# Patient Record
Sex: MALE | Race: WHITE | Employment: FULL TIME | URBAN - NONMETROPOLITAN AREA
[De-identification: names, ages, dates, MRNs, and addresses within clinical notes are randomized per-mention and may not be internally consistent; named-entity substitution may affect disease eponyms.]

---

## 2017-12-25 ENCOUNTER — HOSPITAL ENCOUNTER (EMERGENCY)
Age: 32
Discharge: HOME | End: 2017-12-25
Payer: COMMERCIAL

## 2017-12-25 VITALS
DIASTOLIC BLOOD PRESSURE: 79 MMHG | HEART RATE: 84 BPM | SYSTOLIC BLOOD PRESSURE: 138 MMHG | BODY MASS INDEX: 30.85 KG/M2 | TEMPERATURE: 97.9 F | WEIGHT: 215 LBS | RESPIRATION RATE: 18 BRPM | OXYGEN SATURATION: 96 %

## 2017-12-25 DIAGNOSIS — R04.0 EPISTAXIS: Primary | ICD-10-CM

## 2017-12-25 DIAGNOSIS — J11.1 INFLUENZA WITH RESPIRATORY MANIFESTATION OTHER THAN PNEUMONIA: ICD-10-CM

## 2017-12-25 LAB
FLU A ANTIGEN: POSITIVE
FLU B ANTIGEN: NEGATIVE

## 2017-12-25 PROCEDURE — 99283 EMERGENCY DEPT VISIT LOW MDM: CPT

## 2017-12-25 PROCEDURE — 87804 INFLUENZA ASSAY W/OPTIC: CPT

## 2017-12-25 PROCEDURE — 30901 CONTROL OF NOSEBLEED: CPT

## 2017-12-25 RX ORDER — AMOXICILLIN AND CLAVULANATE POTASSIUM 875; 125 MG/1; MG/1
1 TABLET, FILM COATED ORAL 2 TIMES DAILY
Qty: 20 TABLET | Refills: 0 | Status: SHIPPED | OUTPATIENT
Start: 2017-12-25 | End: 2018-01-04

## 2017-12-25 RX ORDER — HYDROCODONE BITARTRATE AND ACETAMINOPHEN 5; 325 MG/1; MG/1
1 TABLET ORAL EVERY 6 HOURS PRN
Qty: 20 TABLET | Refills: 0 | Status: SHIPPED | OUTPATIENT
Start: 2017-12-25 | End: 2018-01-01

## 2017-12-25 ASSESSMENT — ENCOUNTER SYMPTOMS
SORE THROAT: 0
VOMITING: 0
RHINORRHEA: 1
BACK PAIN: 0
SHORTNESS OF BREATH: 0
EYE REDNESS: 0
WHEEZING: 0
DIARRHEA: 0
COUGH: 1
EYE DISCHARGE: 0
NAUSEA: 0
ABDOMINAL PAIN: 0

## 2017-12-26 NOTE — ED NOTES
Pt presents to the ED through triage with complaints of epistaxis, cough, and nasal congestion. Pt has had a cough and nasal congestion for the past couple of days. States that he had a fever and was taking medicine for that and OTC medicine for his symptoms. Wife wants him to be tested for the flu. Pt also developed a bloody nose today. Wife states that she used a \"slim tampon\" up the nose to try to stop the bleeding, states that he is currently on his fourth one, so decided to come in because it wasn't stopping.      Paola De León RN  12/25/17 [id number]

## 2017-12-26 NOTE — ED PROVIDER NOTES
Soft. He exhibits no distension. Musculoskeletal: Normal range of motion. He exhibits no edema. Neurological: He is alert and oriented to person, place, and time. He exhibits normal muscle tone. GCS eye subscore is 4. GCS verbal subscore is 5. GCS motor subscore is 6. Skin: Skin is warm and dry. He is not diaphoretic. No erythema. Psychiatric: He has a normal mood and affect. His behavior is normal.   Nursing note and vitals reviewed. DIFFERENTIAL DIAGNOSIS:   Including but not limited to: Epistaxis. DIAGNOSTIC RESULTS     EKG: All EKG's are interpreted by the Emergency Department Physician who either signs or Co-signs this chart in the absence of a cardiologist.    None    RADIOLOGY: non-plain film images(s) such as CT, Ultrasound and MRI are read by the radiologist.    No orders to display       LABS:   Labs Reviewed   RAPID INFLUENZA A/B ANTIGENS - Abnormal; Notable for the following:        Result Value    Flu A Antigen POSITIVE (*)     All other components within normal limits       EMERGENCY DEPARTMENT COURSE:   Vitals:    Vitals:    12/25/17 2126   BP: 138/79   Pulse: 84   Resp: 18   Temp: 97.9 °F (36.6 °C)   TempSrc: Oral   SpO2: 96%   Weight: 215 lb (97.5 kg)     9:25 PM: The patient was seen and evaluated. The patient was seen and evaluated within the ED today with epistaxis. Within the department, I observed the patient's vital signs to be within acceptable range. On exam, I appreciated the right nostril to be actively bleeding. It appears to be coming from the hessle box plexus. No other signs of injury. I observed the patient's condition to improve during the duration of the stay. I explained my proposed course of treatment to the patient, who was amenable to my treatment and discharge decisions.  The patient was discharged home in stable condition with prescriptions for Augmentin and Norco, and the patient will return to the ED if the symptoms become more severe in nature or otherwise change. CRITICAL CARE:   None     CONSULTS:  None    PROCEDURES:  Epistaxis Mgmt  Date/Time: 12/25/2017 9:46 PM  Performed by: Keyana Arevalo  Authorized by: Keyana Arevalo     Consent:     Consent obtained:  Verbal    Consent given by:  Patient    Risks discussed:  Bleeding, infection, nasal injury and pain    Alternatives discussed:  No treatment  Anesthesia (see MAR for exact dosages): Anesthesia method:  Topical application    Topical anesthetic:  Lidocaine gel and epinephrine  Post-procedure details:     Assessment:  Bleeding stopped    Patient tolerance of procedure: Tolerated well, no immediate complications          FINAL IMPRESSION      1. Epistaxis    2. Influenza with respiratory manifestation other than pneumonia          DISPOSITION/PLAN   Patient was discharged in stable condition. Will return if symptoms change or worsen, or for any sign or symptom deemed emergent by the patient or family members. Follow up as an outpatient, sooner if symptoms warrant. PATIENT REFERRED TO:  No follow-up provider specified.     DISCHARGE MEDICATIONS:  Discharge Medication List as of 12/25/2017 11:11 PM      START taking these medications    Details   amoxicillin-clavulanate (AUGMENTIN) 875-125 MG per tablet Take 1 tablet by mouth 2 times daily for 10 days, Disp-20 tablet, R-0Print      HYDROcodone-acetaminophen (NORCO) 5-325 MG per tablet Take 1 tablet by mouth every 6 hours as needed for Pain ., Disp-20 tablet, R-0Print             (Please note that portions of this note were completed with a voice recognition program.  Efforts were made to edit the dictations but occasionally words are mis-transcribed.)    Scribe:  Signed by: Marie Edgar, 12/25/17 9:25 PM Scribing for and in the presence of Kaylan Jerry PA-C    Signed by: Marie Edgar, 12/25/17 7:06 PM    Provider:  I personally performed the services described in the documentation, reviewed and edited the documentation which was dictated to the scribe in my presence, and it accurately records my words and actions.     Kaylan Jerry PA-C 12/25/17 7:06 PM        GIOVANNI Peña  12/26/17 [id number]

## 2021-01-22 ENCOUNTER — NURSE ONLY (OUTPATIENT)
Dept: LAB | Age: 36
End: 2021-01-22

## 2024-10-31 ENCOUNTER — HOSPITAL ENCOUNTER (OUTPATIENT)
Dept: PHYSICAL THERAPY | Age: 39
Setting detail: THERAPIES SERIES
Discharge: HOME | End: 2024-10-31

## 2024-10-31 NOTE — PROGRESS NOTES
[facility]  PHYSICAL THERAPY MISSED TREATMENT NOTE  [facility]    Date: 10/31/2024  Patient Name: Michael Aarna        MRN: [id number]   : 1985  (38 y.o.)  Gender: male                REASON FOR MISSED TREATMENT:  Hold per therapist's recommendation .   has gone through extensive testing/imaging and unfortunately has not been appropriately evaluated. He is scheduled to see specialist with [facility] next Friday. According to , Dr. Carrillo, Dr. Prado and Dr. Rodriguez have all mentioned their concerns with his back. Since this pain started back in February it has become progressively worse and he is noticing greater difficulty with bowel movements. Encouraged him to contact our office with update and will happily evaluate him again if recommended by new specialist.    Elisabeth \"Kelly\" Yobany, DPT  [id number]

## 2024-12-02 ENCOUNTER — OFFICE VISIT (OUTPATIENT)
Dept: PHYSICAL MEDICINE AND REHAB | Age: 39
End: 2024-12-02

## 2024-12-02 VITALS — SYSTOLIC BLOOD PRESSURE: 132 MMHG | DIASTOLIC BLOOD PRESSURE: 84 MMHG

## 2024-12-02 DIAGNOSIS — M54.17 RADICULOPATHY, LUMBOSACRAL REGION: ICD-10-CM

## 2024-12-02 DIAGNOSIS — M79.2 NEUROPATHIC PAIN: ICD-10-CM

## 2024-12-02 DIAGNOSIS — G89.4 CHRONIC PAIN SYNDROME: Primary | ICD-10-CM

## 2024-12-02 DIAGNOSIS — M51.362 DEGENERATION OF INTERVERTEBRAL DISC OF LUMBAR REGION WITH DISCOGENIC BACK PAIN AND LOWER EXTREMITY PAIN: ICD-10-CM

## 2024-12-02 RX ORDER — HYDROCODONE BITARTRATE AND ACETAMINOPHEN 5; 325 MG/1; MG/1
1 TABLET ORAL 2 TIMES DAILY PRN
Qty: 14 TABLET | Refills: 0 | Status: SHIPPED | OUTPATIENT
Start: 2024-12-02 | End: 2024-12-09

## 2024-12-02 RX ORDER — METHYLPREDNISOLONE ACETATE 40 MG/ML
40 INJECTION, SUSPENSION INTRA-ARTICULAR; INTRALESIONAL; INTRAMUSCULAR; SOFT TISSUE ONCE
Status: COMPLETED | OUTPATIENT
Start: 2024-12-02 | End: 2024-12-02

## 2024-12-02 RX ORDER — FAMOTIDINE 40 MG/1
40 TABLET, FILM COATED ORAL 2 TIMES DAILY
COMMUNITY
Start: 2024-11-08

## 2024-12-02 RX ORDER — SUCRALFATE 1 G/1
1 TABLET ORAL 2 TIMES DAILY
COMMUNITY
Start: 2024-11-08

## 2024-12-02 RX ADMIN — METHYLPREDNISOLONE ACETATE 40 MG: 40 INJECTION, SUSPENSION INTRA-ARTICULAR; INTRALESIONAL; INTRAMUSCULAR; SOFT TISSUE at 09:54

## 2024-12-02 NOTE — PROGRESS NOTES
Functionality Assessment/Goals Worksheet     On a scale of 0 (Does not Interfere) to 10 (Completely Interferes)     1.  Which number describes how during the past week pain has interfered with           the following:  A.  General Activity:  8  B.  Mood: 7  C.  Walking Ability:  8  D.  Normal Work (Includes both work outside the home and housework):  10  E.  Relations with Other People:   7  F.  Sleep:   9  G.  Enjoyment of Life:   3    2.  Patient Prefers to Take their Pain Medications:     []  On a regular basis   [x]  Only when necessary    []  Does not take pain medications    3.  What are the Patient's Goals/Expectations for Visiting Pain Management?     []  Learn about my pain    []  Receive Medication   []  Physical Therapy     []  Treat Depression   [x]  Receive Injections    []  Treat Sleep   []  Deal with Anxiety and Stress   []  Treat Opoid Dependence/Addiction   []  Other:
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
## 2024-12-20 NOTE — DISCHARGE INSTRUCTIONS
Post procedure Instructions:    No driving or making significant decisions for the remainder of the day.   Be cautions with walking and activity for 24 hours, do not over exert yourself.  Ok to resume pre-procedure activity level today.  Apply ice to site of injection site if you have pain or discomfort.  Do not submerge sit of injection during bath or pool activities for 48 hours post-procedure.  Resume blood thinning medications in 24 hours.     Call office [phone] if you have:  Temperature greater than 100.4  Persistent nausea and vomiting  Severe uncontrolled pain  Redness, tenderness, or signs of infection (pain, swelling, redness, odor or green/yellow discharge around the site)  Difficulty breathing, headache or visual disturbances  Hives  Persistent dizziness or light-headedness  Extreme fatigue  Any other questions or concerns you may have after discharge    In an emergency, call 911 or go to an Emergency Department at a nearby hospital    “Surgical Site Infections”      How can we work together to prevent Surgical Site Infections?   We would like to thank you for choosing [facility] for your Surgical Care.  Below you will find helpful information on how we can work together to prevent Surgical Site Infections.    What is a Surgical Site Infection (SSI)?  A surgical site infection is an infection that occurs after surgery in the part of the body where the surgery took place. Most patients who have surgery do not develop an infection. However, infections develop in about 1 to 3 out of every 100 patients who have surgery.  Some of the common symptoms of a surgical site infection are:  Redness and pain around the area where you had surgery  Drainage of cloudy fluid from your surgical wound  Fever    Can SSIs be treated?  Yes. Most surgical site infections can be treated with antibiotics. The antibiotic given to you depends on the bacteria (germs) causing the infection. Sometimes patients with

## 2024-12-30 ENCOUNTER — TELEPHONE (OUTPATIENT)
Dept: PHYSICAL MEDICINE AND REHAB | Age: 39
End: 2024-12-30

## 2024-12-30 NOTE — TELEPHONE ENCOUNTER
Pt's wife called this morning stating that the pt had an infected toenail and they put him on an antibiotic. He finished the antibiotic on Friday, 12/27/24. Can pt still have procedure tomorrow, 12/31/24 or should I reschedule his procedure? Please advise.

## 2024-12-30 NOTE — H&P
Today, patient presents for planned  lumbar epidural steroid injection approach at lumbar 5/sacral 1    This note is reflective of the patient's previous visit for evaluation. We will proceed with today's planned procedure. Since patient's last visit for evaluation, there have been no interval changes in medical history. Patient has no new numbness, weakness, or focal neurological deficit since evaluation. Patient has no contraindications to injection (no anticoagulation or recent antibiotic intake for active infections), and has a  present or is able to drive themselves (as discussed and cleared by physician).  Allergies to latex, contrast dye, and steroid medications have been confirmed with the patient prior to the procedure.  NPO necessity has been assessed and accepted based on procedure complexity. The risks and benefits of the procedure have been explained including but are not limited to infection, bleeding, paralysis, immediate post procedure weakness, and dizziness; the patient acknowledges understanding and desires to proceed with the procedure. Patient has signed consent for same procedure as discussed in previous clinic encounter. All other questions and concerns were addressed at bedside. See procedure note for full details.       Post procedure Instructions: The patient was advised not to drive during the day of the procedure and not to engage in any significant decision making (unless otherwise states by physician). The patient was also advised to be cautious with walking/activity for 24 hours following today's visit and asked not to engage in over-exertion (unless otherwise states by physician). After this time, it is ok to resume pre-procedure level of activity. Patient advised to apply ice to site of injection in situations of pain and discomfort. Patient advised to not submerge site of injection during bath or pool activities for approximately 24 hours post-procedure. Patient attested to

## 2024-12-31 ENCOUNTER — APPOINTMENT (OUTPATIENT)
Dept: GENERAL RADIOLOGY | Age: 39
End: 2024-12-31
Attending: ANESTHESIOLOGY
Payer: COMMERCIAL

## 2024-12-31 ENCOUNTER — HOSPITAL ENCOUNTER (OUTPATIENT)
Age: 39
Setting detail: OUTPATIENT SURGERY
Discharge: HOME | End: 2024-12-31
Attending: ANESTHESIOLOGY | Admitting: ANESTHESIOLOGY
Payer: COMMERCIAL

## 2024-12-31 VITALS
TEMPERATURE: 98.2 F | RESPIRATION RATE: 16 BRPM | WEIGHT: 219 LBS | OXYGEN SATURATION: 95 % | HEART RATE: 67 BPM | HEIGHT: 69 IN | BODY MASS INDEX: 32.44 KG/M2 | SYSTOLIC BLOOD PRESSURE: 127 MMHG | DIASTOLIC BLOOD PRESSURE: 78 MMHG

## 2024-12-31 PROCEDURE — 6360000002 HC RX W HCPCS: Performed by: ANESTHESIOLOGY

## 2024-12-31 PROCEDURE — 2500000003 HC RX 250 WO HCPCS: Performed by: ANESTHESIOLOGY

## 2024-12-31 PROCEDURE — 6360000004 HC RX CONTRAST MEDICATION: Performed by: ANESTHESIOLOGY

## 2024-12-31 PROCEDURE — 62323 NJX INTERLAMINAR LMBR/SAC: CPT | Performed by: ANESTHESIOLOGY

## 2024-12-31 PROCEDURE — 2709999900 HC NON-CHARGEABLE SUPPLY: Performed by: ANESTHESIOLOGY

## 2024-12-31 PROCEDURE — 7100000010 HC PHASE II RECOVERY - FIRST 15 MIN: Performed by: ANESTHESIOLOGY

## 2024-12-31 PROCEDURE — 3600000054 HC PAIN LEVEL 3 BASE: Performed by: ANESTHESIOLOGY

## 2024-12-31 RX ORDER — LIDOCAINE HYDROCHLORIDE 10 MG/ML
INJECTION, SOLUTION EPIDURAL; INFILTRATION; INTRACAUDAL; PERINEURAL PRN
Status: DISCONTINUED | OUTPATIENT
Start: 2024-12-31 | End: 2024-12-31 | Stop reason: ALTCHOICE

## 2024-12-31 RX ORDER — DEXAMETHASONE SODIUM PHOSPHATE 4 MG/ML
INJECTION, SOLUTION INTRA-ARTICULAR; INTRALESIONAL; INTRAMUSCULAR; INTRAVENOUS; SOFT TISSUE PRN
Status: DISCONTINUED | OUTPATIENT
Start: 2024-12-31 | End: 2024-12-31 | Stop reason: ALTCHOICE

## 2024-12-31 RX ORDER — ACYCLOVIR 200 MG/1
CAPSULE ORAL PRN
Status: DISCONTINUED | OUTPATIENT
Start: 2024-12-31 | End: 2024-12-31 | Stop reason: ALTCHOICE

## 2024-12-31 RX ORDER — IOPAMIDOL 612 MG/ML
INJECTION, SOLUTION INTRAVASCULAR PRN
Status: DISCONTINUED | OUTPATIENT
Start: 2024-12-31 | End: 2024-12-31 | Stop reason: ALTCHOICE

## 2024-12-31 ASSESSMENT — PAIN - FUNCTIONAL ASSESSMENT
PAIN_FUNCTIONAL_ASSESSMENT: 0-10
PAIN_FUNCTIONAL_ASSESSMENT: 0-10

## 2024-12-31 NOTE — PROCEDURES
Pre-operative Diagnosis: Radicular leg pain     Post-operative Diagnosis: Radicular leg pain     Procedure: Lumbar epidural steroid injection    Procedure Description:  After having obtained a signed informed consent, the patient was taken to the fluoroscopy suite and placed in the prone position. The patient's back was prepped with chloraprep and draped in a sterile fashion.  A total of 1.5 cc of 1 % lidocaine was used to anesthetize the skin and underlying tissues.  Under fluoroscopic guidance, a single 20G Tuohy needle was advanced using midline approach at the L5/S1 interspace until gaining the epidural space using the loss of resistance to saline syringe technique.  There were no paresthesias, heme, or CSF aspiration.  A total of 0.25 cc of Omnipaque 300 were injected having had adequate dye spread within the epidural space. Needle placement and contrast spread was confirmed using the AP and contralateral views.  10 mg of Dexamethasone with 1 cc of saline solution were injected in the epidural space. The needle was flushed and removed without any complication.  The patient tolerated the procedure well and was transported to the recovery room. The patient was observed for 15 minutes to then discharged in an ambulatory fashion.    Procedural Complications: None  Estimated Blood Loss: 0 mL        Dangelo Ha DO  Interventional Pain Management/PM&R   [facility]

## 2024-12-31 NOTE — PROGRESS NOTES
1404 Patient arrived to phase II via cart.  Spontaneous respiraitons even and unlabored.  Placed on monitor--VSS.  Report received from OR RN    1405 Assessment completed.  Patient is alert and oriented x4. Patient denies pain--will monitor.  Injection sites clean and dry.      1406  Pt. Denies all other needs at this time, and states readiness for discharge.    1407 Pt. Standing at bedside. With stand by assist of RN. Pt. Denies weakness or dizziness.    1408 Pt. Getting self dressed. Family notified of discharge    1411 Pt. Ambulated to private vehicle in stable condition with stand by assist of RN.

## 2025-02-12 ENCOUNTER — OFFICE VISIT (OUTPATIENT)
Dept: PHYSICAL MEDICINE AND REHAB | Age: 40
End: 2025-02-12
Payer: COMMERCIAL

## 2025-02-12 VITALS
BODY MASS INDEX: 32.44 KG/M2 | WEIGHT: 219 LBS | DIASTOLIC BLOOD PRESSURE: 70 MMHG | SYSTOLIC BLOOD PRESSURE: 112 MMHG | HEIGHT: 69 IN

## 2025-02-12 DIAGNOSIS — G89.4 CHRONIC PAIN SYNDROME: Primary | ICD-10-CM

## 2025-02-12 DIAGNOSIS — M54.17 RADICULOPATHY, LUMBOSACRAL REGION: ICD-10-CM

## 2025-02-12 DIAGNOSIS — M79.2 NEUROPATHIC PAIN: ICD-10-CM

## 2025-02-12 DIAGNOSIS — M51.362 DEGENERATION OF INTERVERTEBRAL DISC OF LUMBAR REGION WITH DISCOGENIC BACK PAIN AND LOWER EXTREMITY PAIN: ICD-10-CM

## 2025-02-12 PROCEDURE — 99214 OFFICE O/P EST MOD 30 MIN: CPT | Performed by: NURSE PRACTITIONER

## 2025-02-12 RX ORDER — UBROGEPANT 100 MG/1
100 TABLET ORAL DAILY PRN
Qty: 16 TABLET | Refills: 0 | Status: SHIPPED | OUTPATIENT
Start: 2025-02-12

## 2025-02-12 RX ORDER — TOPIRAMATE SPINKLE 25 MG/1
25 CAPSULE ORAL 3 TIMES DAILY
COMMUNITY

## 2025-02-12 NOTE — PROGRESS NOTES
[facility] PROFESSIONAL SERVS  [facility] NEUROSCIENCE AND REHABILITATION [address]  [facility] [address]  Dept: [phone]  Dept Fax: [phone]  Loc: [phone]    Visit Date: 2/12/2025    Functionality Assessment/Goals Worksheet     On a scale of 0 (Does not Interfere) to 10 (Completely Interferes)     1.  Which number describes how during the past week pain has interfered with       the following:  A.  General Activity:  7  B.  Mood: 7  C.  Walking Ability:  8  D.  Normal Work (Includes both work outside the home and housework):  8  E.  Relations with Other People:   7  F.  Sleep:   7  G.  Enjoyment of Life:   7    2.  Patient Prefers to Take their Pain Medications:     []  On a regular basis   [x]  Only when necessary    []  Does not take pain medications    3.  What are the Patient's Goals/Expectations for Visiting Pain Management?     []  Learn about my pain    []  Receive Medication   []  Physical Therapy     []  Treat Depression   []  Receive Injections    []  Treat Sleep   []  Deal with Anxiety and Stress   []  Treat Opoid Dependence/Addiction   [x]  Other:  
Ubrelvy 100 mg.  I also sent a prescription to the pharmacy.  He can call if he like a refill of Norco.  He will continue work with [facility] neuromuscular.  We will touch base in 2 months.  He may benefit from a transforaminal lumbar epidural steroid injection in the future.    Plan:   The following treatment recommendations and plan were discussed in detail with Michael Arana.    Imaging:   I have reviewed patient’s imaging of lumbar MRI and results were discussed with patient today.     Analgesics:   The patient is currently managing their pain with the use of Norco 5-325 mg sparingly. I have refilled this medication BID PRN x 7 days for severe pain >7/10.      Patient is advised to take the medication as prescribed as taking more than prescribed can lead to the development of tolerance, physical dependency, and addiction.  Patient is advised to store and lock the medication in a safe and secure location.  If the medication is lost or stolen we will not provide early refills on this medication.  Patient understands that they must stay compliant with treatment plan outlined above in order to stay compliant with opioid therapy and any deviation from treatment plan will result in cessation of opioid therapy.  Risks of long-term opioid therapy were discussed in extensive detail with the patient and patient understands the risks involved with continuous opioids to manage chronic pain.     Patient has been compliant with office visits, the rehabilitation plan including attending therapy as warranted, and injection therapy.  Patient has not demonstrated any aberrant behavior with medication.  Pain contract signed and reviewed by patient 12/02/2024.  Patient understands if the contract is violated in any way opioid therapy will be discontinued immediately and/or offered an appropriate weaning schedule.  OARRS reviewed and is appropriate.  Last UDS: will obtain at follow up if needed      Patient is taking

## 2025-03-13 ENCOUNTER — HOSPITAL ENCOUNTER (OUTPATIENT)
Dept: PHYSICAL THERAPY | Age: 40
Setting detail: THERAPIES SERIES
Discharge: HOME | End: 2025-03-13
Payer: COMMERCIAL

## 2025-03-13 PROCEDURE — 97162 PT EVAL MOD COMPLEX 30 MIN: CPT

## 2025-03-13 PROCEDURE — 97110 THERAPEUTIC EXERCISES: CPT

## 2025-03-13 NOTE — PROGRESS NOTES
pain   []  Patient limited by medical complications  []  Other:     Assessment:  is a 39 yr old gentleman referred to PT to address residual weakness throughout his core, LE and UE following confirmed lumbar disc tears and acute flare up of severe fibromyalgia. Tio was initially sent here for PT last October, however his pain was not well managed and after following up with [facility] he was put on rest for the last few months. Today's evaluation indicated deficits in both UE and LE strength, limited hamstring flexibility and core weakness consistent with months of inactivity. He will greatly benefit from PT to work to improve core strength/stabilization, lumbar and cervical flexibility and UE strength so he may return to work without limitations.     Body Structures/Functions/Activity Limitations: impaired activity tolerance, impaired endurance, impaired ROM, impaired balance   Prognosis: good    GOALS:  Patient Goal: return to work; improve strength and stamina     Short Term Goals: 4 weeks  Tio will demonstrate and maintain TA activation with all exercises, transfers providing greater internal support to his lumbar spine.  Tio's radicular symptoms down his left leg will centralize with sciatic nerve glide with goal of complete resolution.   Tio's Modified MARIA INES will improve to <15 indicating moderate disability related to his back pain.  Tio's posture will improve as scapular stabilization improves minimizing cervical and scapular protraction.    Long Term Goals: 8 weeks  Tio will be able to bend, stoop, lift, squat with 50# repeatedly, demonstrating good body mechanics, so he may safely return to work.  Tio's Modified MARIA INES will improve to <5 indicating minimal disability related to his back pain.  Tio will be able to stand, sit, walk for 2+ hrs at a time while maintaining good posture so he may safely return to work.   Tio will be discharged from PT with independent  HEP to maintain all gains

## 2025-03-18 ENCOUNTER — HOSPITAL ENCOUNTER (OUTPATIENT)
Dept: PHYSICAL THERAPY | Age: 40
Setting detail: THERAPIES SERIES
Discharge: HOME | End: 2025-03-18
Payer: COMMERCIAL

## 2025-03-18 PROCEDURE — 97110 THERAPEUTIC EXERCISES: CPT

## 2025-03-18 PROCEDURE — 97140 MANUAL THERAPY 1/> REGIONS: CPT

## 2025-03-18 NOTE — PROGRESS NOTES
[facility]  PHYSICAL THERAPY  [] EVALUATION  [x] DAILY NOTE (LAND) [] DAILY NOTE (AQUATIC ) [] PROGRESS NOTE [] DISCHARGE NOTE    [] OUTPATIENT REHABILITATION CENTER [facility]   [] [place] [facility]    [] [facility]   [x] [facility]    Date: 3/18/2025  Patient Name:  Michael rAana  : 1985  MRN: [id number]  CSN: [id number]    Referring Practitioner Ariella Olson PA-C [id number]      Diagnosis  Diagnoses       G89.29 (ICD-10-CM) - Other chronic pain           Treatment Diagnosis M54.59  Other Low Back Pain  M62.81 Generalized Muscle Weakness   Date of Evaluation 3/13/25   Additional Pertinent History Michael Arana has a past medical history of GERD (gastroesophageal reflux disease), Pancreas divisum, and Stomach ulcer.  he has a past surgical history that includes Colonoscopy; Endoscopy, colon, diagnostic; and Pain management procedure (N/A, 2024).     Allergies No Known Allergies   Medications   Current Outpatient Medications:     topiramate (TOPAMAX SPRINKLE) 25 MG capsule, Take 1 capsule by mouth 3 times daily, Disp: , Rfl:     Ubrogepant (UBRELVY) 100 MG TABS, Take 100 mg by mouth daily as needed (pain), Disp: 16 tablet, Rfl: 0    Dextromethorphan HBr (DEXALONE PO), Take by mouth, Disp: , Rfl:     famotidine (PEPCID) 40 MG tablet, Take 1 tablet by mouth 2 times daily, Disp: , Rfl:     sucralfate (CARAFATE) 1 GM tablet, Take 1 tablet by mouth 2 times daily, Disp: , Rfl:       Functional Outcome Measure Used Modified MARIA INES   Functional Outcome Score 25/50 or 50% disability (3/13/25)       Insurance: Primary: Payor: [place] BCBS /  /  / ,   Secondary:    Authorization Information PRE CERTIFICATION REQUIRED: Additional authorization not required.  INSURANCE THERAPY BENEFIT: Allowed 70 visits of OT, PT, COMBINED per calendar year.  0 visits have been used.. ST IS 30 VISITS PER CALENDAR YEAR Hard Max..   AQUATIC THERAPY COVERED: Yes  MODALITIES COVERED:

## 2025-03-20 ENCOUNTER — HOSPITAL ENCOUNTER (OUTPATIENT)
Dept: PHYSICAL THERAPY | Age: 40
Setting detail: THERAPIES SERIES
Discharge: HOME | End: 2025-03-20
Payer: COMMERCIAL

## 2025-03-20 PROCEDURE — 97110 THERAPEUTIC EXERCISES: CPT

## 2025-03-20 NOTE — PROGRESS NOTES
[facility]  PHYSICAL THERAPY  [] EVALUATION  [x] DAILY NOTE (LAND) [] DAILY NOTE (AQUATIC ) [] PROGRESS NOTE [] DISCHARGE NOTE    [] OUTPATIENT REHABILITATION CENTER [facility]   [] [facility]    [] [facility]   [x] [facility]    Date: 3/20/2025  Patient Name:  Michael Arana  : 1985  MRN: [id number]  CSN: [id number]    Referring Practitioner Ariella Olson PA-C [id number]      Diagnosis  Diagnoses       G89.29 (ICD-10-CM) - Other chronic pain           Treatment Diagnosis M54.59  Other Low Back Pain  M62.81 Generalized Muscle Weakness   Date of Evaluation 3/13/25   Additional Pertinent History Michael Arana has a past medical history of GERD (gastroesophageal reflux disease), Pancreas divisum, and Stomach ulcer.  he has a past surgical history that includes Colonoscopy; Endoscopy, colon, diagnostic; and Pain management procedure (N/A, 2024).     Allergies No Known Allergies   Medications   Current Outpatient Medications:     topiramate (TOPAMAX SPRINKLE) 25 MG capsule, Take 1 capsule by mouth 3 times daily, Disp: , Rfl:     Ubrogepant (UBRELVY) 100 MG TABS, Take 100 mg by mouth daily as needed (pain), Disp: 16 tablet, Rfl: 0    Dextromethorphan HBr (DEXALONE PO), Take by mouth, Disp: , Rfl:     famotidine (PEPCID) 40 MG tablet, Take 1 tablet by mouth 2 times daily, Disp: , Rfl:     sucralfate (CARAFATE) 1 GM tablet, Take 1 tablet by mouth 2 times daily, Disp: , Rfl:       Functional Outcome Measure Used Modified MARIA INES   Functional Outcome Score 25/50 or 50% disability (3/13/25)       Insurance: Primary: Payor: [place] BCBS /  /  / ,   Secondary:    Authorization Information PRE CERTIFICATION REQUIRED: Additional authorization not required.  INSURANCE THERAPY BENEFIT: Allowed 70 visits of OT, PT, COMBINED per calendar year.  0 visits have been used.. ST IS 30 VISITS PER CALENDAR YEAR [facility]..   AQUATIC THERAPY COVERED: Yes  MODALITIES COVERED:

## 2025-03-25 ENCOUNTER — HOSPITAL ENCOUNTER (OUTPATIENT)
Dept: PHYSICAL THERAPY | Age: 40
Setting detail: THERAPIES SERIES
Discharge: HOME | End: 2025-03-25
Payer: COMMERCIAL

## 2025-03-25 PROCEDURE — 97140 MANUAL THERAPY 1/> REGIONS: CPT

## 2025-03-25 PROCEDURE — 97110 THERAPEUTIC EXERCISES: CPT

## 2025-03-25 NOTE — PROGRESS NOTES
[facility]  PHYSICAL THERAPY  [] EVALUATION  [x] DAILY NOTE (LAND) [] DAILY NOTE (AQUATIC ) [] PROGRESS NOTE [] DISCHARGE NOTE    [] OUTPATIENT REHABILITATION CENTER [facility]   [] [place] [facility]    [] [facility]   [x] [facility]ENNA [facility]    Date: 3/25/2025  Patient Name:  Michael Arana  : 1985  MRN: [id number]  CSN: [id number]    Referring Practitioner Ariella Olson PA-C [id number]      Diagnosis  Diagnoses       G89.29 (ICD-10-CM) - Other chronic pain           Treatment Diagnosis M54.59  Other Low Back Pain  M62.81 Generalized Muscle Weakness   Date of Evaluation 3/13/25   Additional Pertinent History Michael Arana has a past medical history of GERD (gastroesophageal reflux disease), Pancreas divisum, and Stomach ulcer.  he has a past surgical history that includes Colonoscopy; Endoscopy, colon, diagnostic; and Pain management procedure (N/A, 2024).     Allergies No Known Allergies   Medications   Current Outpatient Medications:     topiramate (TOPAMAX SPRINKLE) 25 MG capsule, Take 1 capsule by mouth 3 times daily, Disp: , Rfl:     Ubrogepant (UBRELVY) 100 MG TABS, Take 100 mg by mouth daily as needed (pain), Disp: 16 tablet, Rfl: 0    Dextromethorphan HBr (DEXALONE PO), Take by mouth, Disp: , Rfl:     famotidine (PEPCID) 40 MG tablet, Take 1 tablet by mouth 2 times daily, Disp: , Rfl:     sucralfate (CARAFATE) 1 GM tablet, Take 1 tablet by mouth 2 times daily, Disp: , Rfl:       Functional Outcome Measure Used Modified MARIA INES   Functional Outcome Score 25/50 or 50% disability (3/13/25)       Insurance: Primary: Payor: [place] [employer] /  /  / ,   Secondary:    Authorization Information PRE CERTIFICATION REQUIRED: Additional authorization not required.  INSURANCE THERAPY BENEFIT: Allowed 70 visits of OT, PT, COMBINED per calendar year.  0 visits have been used.. ST IS 30 VISITS PER CALENDAR YEAR Hard Max..   AQUATIC THERAPY COVERED: Yes  MODALITIES COVERED:

## 2025-03-27 ENCOUNTER — HOSPITAL ENCOUNTER (OUTPATIENT)
Dept: PHYSICAL THERAPY | Age: 40
Setting detail: THERAPIES SERIES
Discharge: HOME | End: 2025-03-27
Payer: COMMERCIAL

## 2025-03-27 PROCEDURE — 97140 MANUAL THERAPY 1/> REGIONS: CPT

## 2025-03-27 PROCEDURE — 97110 THERAPEUTIC EXERCISES: CPT

## 2025-03-27 NOTE — PROGRESS NOTES
demonstrating good body mechanics, so he may safely return to work.  Tio's Modified MARIA INES will improve to <5 indicating minimal disability related to his back pain.  Tio will be able to stand, sit, walk for 2+ hrs at a time while maintaining good posture so he may safely return to work.   Tio will be discharged from PT with independent  HEP to maintain all gains achieved in clinic.    Patient Education:   [x]  HEP/Education Completed: Plan of Care, Goals, corner stretch, scapular strengthening ex program  [employer] Access Code: [email or web address]   []  No new Education completed  []  Reviewed Prior HEP      [x]  Patient verbalized and/or demonstrated understanding of education provided.  []  Patient unable to verbalize and/or demonstrate understanding of education provided.  Will continue education.  [x]  Barriers to learning: n/a    PLAN:  Treatment Recommendations: Strengthening, Range of Motion, Balance Training, Neuromuscular Re-education, Manual Therapy - Soft Tissue Mobilization, Manual Therapy - Joint Manipulation, Pain Management, Home Exercise Program, Patient Education, Integrative Dry Needling, Aquatics, and Modalities    []  Plan of care initiated.  Plan to see patient 2 times per week for 8 weeks to address the treatment planned outlined above.  [x]  Continue with current plan of care  []  Modify plan of care as follows:    []  Hold pending physician visit  []  Discharge    Time In 0758   Time Out 0841   Timed Code Minutes: 43 min   Total Treatment Time: 43 min       Electronically Signed by: Elisabeth Haywood, PT

## 2025-04-01 ENCOUNTER — HOSPITAL ENCOUNTER (OUTPATIENT)
Dept: PHYSICAL THERAPY | Age: 40
Setting detail: THERAPIES SERIES
Discharge: HOME | End: 2025-04-01
Payer: COMMERCIAL

## 2025-04-01 PROCEDURE — 97110 THERAPEUTIC EXERCISES: CPT

## 2025-04-01 PROCEDURE — 97140 MANUAL THERAPY 1/> REGIONS: CPT

## 2025-04-01 NOTE — PROGRESS NOTES
Yes     REFERENCE NUMBER: [id number]   Initial CPT Codes Requested 56332 - Therapeutic Exercise, 13319 - Manual Therapy , 32233 - Aquatic Therapeutic Exercise, 79797 - Neuromuscular Re-Education , and 11060 - Therapeutic Activities   Progress Note Counter 6/10 for progress note (Reporting Period: 3/13 to     )   Recertification Date 06/05/25   Physician Follow-Up    Physician Orders    History of Present Illness Tio returns to therapy after consulting with Dr. Carrillo, [facility] and pain management. He has had 2 EMG studies and the results did not seem to line up with results of MRI. He saw a neurosurgeon in [place] who then referred him to [facility] for further neuromuscular evaluation. Based on EMG studies, Tio was told that his tears in discs had healed. [facility] diagnosed him with severe fibromyalgia and until he figures out the triggers he is likely to continue tearing.     He is currently being followed by neuro with [facility] and pain management with [facility]. He has minor radicular symptoms radiating down lateral aspect of his left leg.      SUBJECTIVE:  Was feeling great until weather change; woke up sore this morning. Noticed more foot drag on the left yesterday    TREATMENT   Precautions:    Pain: 6/10 low back into L leg, 4-5/10 cervical     \"X” in shaded column indicates activity completed today    “*\" next to exercise/intervention indicates progression   Modalities Parameters/  Location  Notes                     Manual Therapy Time/Technique  Notes   Cervical traction; manual stretch to right upper trap/levator 5 min     STM throughout bilateral hip flexors  5 min           DN: Homeostatic point 22, 15 and 14 40 mm-bilaterally x Posterior cutaneous L2 and L5; superior cluneal   DN: Homeostatic point #16 60 mm x 1 needle on left side   DN: Homeostatic point #3; #13 40 mm x 2 needles in each               Exercise/Intervention   Notes   UBE       NuStep 5min.

## 2025-04-03 ENCOUNTER — HOSPITAL ENCOUNTER (OUTPATIENT)
Dept: PHYSICAL THERAPY | Age: 40
Setting detail: THERAPIES SERIES
Discharge: HOME | End: 2025-04-03
Payer: COMMERCIAL

## 2025-04-03 PROCEDURE — 97110 THERAPEUTIC EXERCISES: CPT

## 2025-04-03 NOTE — PROGRESS NOTES
[facility]  PHYSICAL THERAPY  [] EVALUATION  [x] DAILY NOTE (LAND) [] DAILY NOTE (AQUATIC ) [] PROGRESS NOTE [] DISCHARGE NOTE    [] OUTPATIENT REHABILITATION CENTER [facility]   [] [place] [facility]    [] [facility]   [x] [facility]    Date: 4/3/2025  Patient Name:  Michael Arana  : 1985  MRN: [id number]  CSN: [id number]    Referring Practitioner Ariella Olson PA-C [id number]      Diagnosis  Diagnoses       G89.29 (ICD-10-CM) - Other chronic pain           Treatment Diagnosis M54.59  Other Low Back Pain  M62.81 Generalized Muscle Weakness   Date of Evaluation 3/13/25   Additional Pertinent History Michael Arana has a past medical history of GERD (gastroesophageal reflux disease), Pancreas divisum, and Stomach ulcer.  he has a past surgical history that includes Colonoscopy; Endoscopy, colon, diagnostic; and Pain management procedure (N/A, 2024).     Allergies No Known Allergies   Medications   Current Outpatient Medications:     topiramate (TOPAMAX SPRINKLE) 25 MG capsule, Take 1 capsule by mouth 3 times daily, Disp: , Rfl:     Ubrogepant (UBRELVY) 100 MG TABS, Take 100 mg by mouth daily as needed (pain), Disp: 16 tablet, Rfl: 0    Dextromethorphan HBr (DEXALONE PO), Take by mouth, Disp: , Rfl:     famotidine (PEPCID) 40 MG tablet, Take 1 tablet by mouth 2 times daily, Disp: , Rfl:     sucralfate (CARAFATE) 1 GM tablet, Take 1 tablet by mouth 2 times daily, Disp: , Rfl:       Functional Outcome Measure Used Modified MARIA INES   Functional Outcome Score 25/50 or 50% disability (3/13/25)       Insurance: Primary: Payor: [place] [employer] /  /  / ,   Secondary:    Authorization Information PRE CERTIFICATION REQUIRED: Additional authorization not required.  INSURANCE THERAPY BENEFIT: Allowed 70 visits of OT, PT, COMBINED per calendar year.  0 visits have been used.. ST IS 30 VISITS PER CALENDAR YEAR Hard Max..   AQUATIC THERAPY COVERED: Yes  MODALITIES COVERED:

## 2025-04-08 ENCOUNTER — HOSPITAL ENCOUNTER (OUTPATIENT)
Dept: PHYSICAL THERAPY | Age: 40
Setting detail: THERAPIES SERIES
Discharge: HOME | End: 2025-04-08
Payer: COMMERCIAL

## 2025-04-08 PROCEDURE — 97110 THERAPEUTIC EXERCISES: CPT

## 2025-04-08 PROCEDURE — 97140 MANUAL THERAPY 1/> REGIONS: CPT

## 2025-04-08 NOTE — PROGRESS NOTES
[facility]  PHYSICAL THERAPY  [] EVALUATION  [x] DAILY NOTE (LAND) [] DAILY NOTE (AQUATIC ) [] PROGRESS NOTE [] DISCHARGE NOTE    [] OUTPATIENT REHABILITATION CENTER [facility]   [] [place] [facility]    [] [facility]   [x] [facility]    Date: 2025  Patient Name:  Michael Arana  : 1985  MRN: [id number]  CSN: [id number]    Referring Practitioner Ariella Olson PA-C [id number]      Diagnosis  Diagnoses       G89.29 (ICD-10-CM) - Other chronic pain           Treatment Diagnosis M54.59  Other Low Back Pain  M62.81 Generalized Muscle Weakness   Date of Evaluation 3/13/25   Additional Pertinent History Michael Arana has a past medical history of GERD (gastroesophageal reflux disease), Pancreas divisum, and Stomach ulcer.  he has a past surgical history that includes Colonoscopy; Endoscopy, colon, diagnostic; and Pain management procedure (N/A, 2024).     Allergies No Known Allergies   Medications   Current Outpatient Medications:     topiramate (TOPAMAX SPRINKLE) 25 MG capsule, Take 1 capsule by mouth 3 times daily, Disp: , Rfl:     Ubrogepant (UBRELVY) 100 MG TABS, Take 100 mg by mouth daily as needed (pain), Disp: 16 tablet, Rfl: 0    Dextromethorphan HBr (DEXALONE PO), Take by mouth, Disp: , Rfl:     famotidine (PEPCID) 40 MG tablet, Take 1 tablet by mouth 2 times daily, Disp: , Rfl:     sucralfate (CARAFATE) 1 GM tablet, Take 1 tablet by mouth 2 times daily, Disp: , Rfl:       Functional Outcome Measure Used Modified MARIA INES   Functional Outcome Score 25/50 or 50% disability (3/13/25)       Insurance: Primary: Payor: [place] [employer] /  /  / ,   Secondary:    Authorization Information PRE CERTIFICATION REQUIRED: Additional authorization not required.  INSURANCE THERAPY BENEFIT: Allowed 70 visits of OT, PT, COMBINED per calendar year.  0 visits have been used.. ST IS 30 VISITS PER CALENDAR YEAR Hard Max..   AQUATIC THERAPY COVERED: Yes  MODALITIES COVERED:

## 2025-04-10 ENCOUNTER — HOSPITAL ENCOUNTER (OUTPATIENT)
Dept: PHYSICAL THERAPY | Age: 40
Setting detail: THERAPIES SERIES
Discharge: HOME | End: 2025-04-10
Payer: COMMERCIAL

## 2025-04-10 PROCEDURE — 97110 THERAPEUTIC EXERCISES: CPT

## 2025-04-10 PROCEDURE — 97530 THERAPEUTIC ACTIVITIES: CPT

## 2025-04-10 NOTE — PROGRESS NOTES
[facility]  PHYSICAL THERAPY  [] EVALUATION  [] DAILY NOTE (LAND) [] DAILY NOTE (AQUATIC ) [x] PROGRESS NOTE [] DISCHARGE NOTE    [] OUTPATIENT REHABILITATION CENTER [facility]   [] [place] [facility]ULATORY CARE [place]    [] [facility]   [x] [facility]    Date: 4/10/2025  Patient Name:  Michael Arana  : 1985  MRN: [id number]  CSN: [id number]    Referring Practitioner Ariella Olson PA-C [id number]      Diagnosis  Diagnoses       G89.29 (ICD-10-CM) - Other chronic pain           Treatment Diagnosis M54.59  Other Low Back Pain  M62.81 Generalized Muscle Weakness   Date of Evaluation 3/13/25   Additional Pertinent History Michael Arana has a past medical history of GERD (gastroesophageal reflux disease), Pancreas divisum, and Stomach ulcer.  he has a past surgical history that includes Colonoscopy; Endoscopy, colon, diagnostic; and Pain management procedure (N/A, 2024).     Allergies No Known Allergies   Medications   Current Outpatient Medications:     topiramate (TOPAMAX SPRINKLE) 25 MG capsule, Take 1 capsule by mouth 3 times daily, Disp: , Rfl:     Ubrogepant (UBRELVY) 100 MG TABS, Take 100 mg by mouth daily as needed (pain), Disp: 16 tablet, Rfl: 0    Dextromethorphan HBr (DEXALONE PO), Take by mouth, Disp: , Rfl:     famotidine (PEPCID) 40 MG tablet, Take 1 tablet by mouth 2 times daily, Disp: , Rfl:     sucralfate (CARAFATE) 1 GM tablet, Take 1 tablet by mouth 2 times daily, Disp: , Rfl:       Functional Outcome Measure Used Modified MARIA INES   Functional Outcome Score 25/50 or 50% disability (3/13/25)   21/50 or 42% disability (4/10/25)      Insurance: Primary: Payor: [facility] /  /  / ,   Secondary:    Authorization Information PRE CERTIFICATION REQUIRED: Additional authorization not required.  INSURANCE THERAPY BENEFIT: Allowed 70 visits of OT, PT, COMBINED per calendar year.  0 visits have been used.. ST IS 30 VISITS PER CALENDAR YEAR Hard Max..   AQUATIC THERAPY

## 2025-04-15 ENCOUNTER — HOSPITAL ENCOUNTER (OUTPATIENT)
Dept: PHYSICAL THERAPY | Age: 40
Setting detail: THERAPIES SERIES
Discharge: HOME | End: 2025-04-15
Payer: COMMERCIAL

## 2025-04-15 PROCEDURE — 97110 THERAPEUTIC EXERCISES: CPT

## 2025-04-15 NOTE — PROGRESS NOTES
[facility]  PHYSICAL THERAPY  [] EVALUATION  [x] DAILY NOTE (LAND) [] DAILY NOTE (AQUATIC ) [] PROGRESS NOTE [] DISCHARGE NOTE    [] OUTPATIENT REHABILITATION CENTER [facility]   [] [place] AMBULATORY CARE [place]    [] [facility]   [x] [facility]    Date: 4/15/2025  Patient Name:  Michael Arana  : 1985  MRN: [id number]  CSN: [id number]    Referring Practitioner Ariella Olson PA-C [id number]      Diagnosis  Diagnoses       G89.29 (ICD-10-CM) - Other chronic pain           Treatment Diagnosis M54.59  Other Low Back Pain  M62.81 Generalized Muscle Weakness   Date of Evaluation 3/13/25   Additional Pertinent History Michael Arana has a past medical history of GERD (gastroesophageal reflux disease), Pancreas divisum, and Stomach ulcer.  he has a past surgical history that includes Colonoscopy; Endoscopy, colon, diagnostic; and Pain management procedure (N/A, 2024).     Allergies No Known Allergies   Medications   Current Outpatient Medications:     topiramate (TOPAMAX SPRINKLE) 25 MG capsule, Take 1 capsule by mouth 3 times daily, Disp: , Rfl:     Ubrogepant (UBRELVY) 100 MG TABS, Take 100 mg by mouth daily as needed (pain), Disp: 16 tablet, Rfl: 0    Dextromethorphan HBr (DEXALONE PO), Take by mouth, Disp: , Rfl:     famotidine (PEPCID) 40 MG tablet, Take 1 tablet by mouth 2 times daily, Disp: , Rfl:     sucralfate (CARAFATE) 1 GM tablet, Take 1 tablet by mouth 2 times daily, Disp: , Rfl:       Functional Outcome Measure Used Modified MARIA INES   Functional Outcome Score 25/50 or 50% disability (3/13/25)   21/50 or 42% disability (4/10/25)      Insurance: Primary: Payor: [facility] /  /  / ,   Secondary:    Authorization Information PRE CERTIFICATION REQUIRED: Additional authorization not required.  INSURANCE THERAPY BENEFIT: Allowed 70 visits of OT, PT, COMBINED per calendar year.  0 visits have been used.. ST IS 30 VISITS PER CALENDAR YEAR Hard Max..   AQUATIC THERAPY

## 2025-04-16 ENCOUNTER — OFFICE VISIT (OUTPATIENT)
Dept: PHYSICAL MEDICINE AND REHAB | Age: 40
End: 2025-04-16
Payer: COMMERCIAL

## 2025-04-16 VITALS
SYSTOLIC BLOOD PRESSURE: 108 MMHG | DIASTOLIC BLOOD PRESSURE: 70 MMHG | HEIGHT: 69 IN | WEIGHT: 219 LBS | BODY MASS INDEX: 32.44 KG/M2

## 2025-04-16 DIAGNOSIS — M79.7 FIBROMYALGIA: ICD-10-CM

## 2025-04-16 DIAGNOSIS — M79.2 NEUROPATHIC PAIN: ICD-10-CM

## 2025-04-16 DIAGNOSIS — G89.4 CHRONIC PAIN SYNDROME: Primary | ICD-10-CM

## 2025-04-16 DIAGNOSIS — M79.18 MYOFASCIAL PAIN: ICD-10-CM

## 2025-04-16 PROCEDURE — 20553 NJX 1/MLT TRIGGER POINTS 3/>: CPT | Performed by: NURSE PRACTITIONER

## 2025-04-16 PROCEDURE — 99214 OFFICE O/P EST MOD 30 MIN: CPT | Performed by: NURSE PRACTITIONER

## 2025-04-16 RX ORDER — TRIAMCINOLONE ACETONIDE 40 MG/ML
40 INJECTION, SUSPENSION INTRA-ARTICULAR; INTRAMUSCULAR ONCE
Status: COMPLETED | OUTPATIENT
Start: 2025-04-16 | End: 2025-04-16

## 2025-04-16 RX ADMIN — TRIAMCINOLONE ACETONIDE 40 MG: 40 INJECTION, SUSPENSION INTRA-ARTICULAR; INTRAMUSCULAR at 14:44

## 2025-04-16 NOTE — PROGRESS NOTES
Functionality Assessment/Goals Worksheet     On a scale of 0 (Does not Interfere) to 10 (Completely Interferes)     1.  Which number describes how during the past week pain has interfered with           the following:  A.  General Activity:  5  B.  Mood: 5  C.  Walking Ability:  4  D.  Normal Work (Includes both work outside the home and housework):  4  E.  Relations with Other People:   4  F.  Sleep:   4  G.  Enjoyment of Life:   4    2.  Patient Prefers to Take their Pain Medications:     []  On a regular basis   [x]  Only when necessary    []  Does not take pain medications    3.  What are the Patient's Goals/Expectations for Visiting Pain Management?     []  Learn about my pain    []  Receive Medication   [x]  Physical Therapy     []  Treat Depression   [x]  Receive Injections    []  Treat Sleep   []  Deal with Anxiety and Stress   []  Treat Opoid Dependence/Addiction   []  Other:                                
Pre-operative Diagnosis: Cervical and lumbar myofascial pain     Post-operative Diagnosis: Cervical and lumbar myofascial pain     Procedure: Trigger point injection(s)     Procedure Description:  After having signed the informed consent, the patient was seated in a chair.  The patient's cervical and lumbar region was prepped with alcohol wipes.  Trigger points were identified in the right trapezius (5) and left lumbar paraspinals (5, L5-S1) for a total of 10 trigger point injections. After negative aspiration, 1 cc of a mixture containing 1:10 40 mg Kenalog : 0.25% bupivacaine was injected at each trigger point for a total of 10 trigger points. The patient tolerated the procedure well.     Procedural Complications: None        BONIFACIO Gaming - CNP   Interventional Pain Management/PM&R   [facility] [place]   
relaxer would be beneficial.  He is also interested in pursuing trigger point injections.      History of Interventions:   Surgery: No previous lumbar surgeries  Injections: TPI [place] Cervical - effective couple weeks.   Botox for migraines - aggravated pain after round 3   LESI L5-S1 (2024) -50% relief x 2 weeks    Current Treatment Medications:   Baclofen 30 mg BID  - helps muscle cramps in legs  Tylenol daily - occasionally effective  Norco 5-325 mg once daily as needed -takes rarely, effective PCP    Historical Treatment Medications:   NSAID contraindicated - nosebleeds, stomach ulcers, GERD  Tylenol - ineffective  Methocarbamol 750 mg  nightly - s/e BP.   Gabapentin - s/e stomach   Lyrica - s/e anxiety   Amitriptyline - severe anxiety attack      Imagin/15/2024 Lumbar MRI          Past Medical History:   Diagnosis Date    GERD (gastroesophageal reflux disease)     Pancreas divisum 2013    Stomach ulcer        Past Surgical History:   Procedure Laterality Date    COLONOSCOPY      polyp removed    ENDOSCOPY, COLON, DIAGNOSTIC      PAIN MANAGEMENT PROCEDURE N/A 2024    lumbar 5/sacral 1 epidural steroid injection performed by Dangelo Ha DO at [facility] OR       No family history on file.      Medications & Allergies:   Current Outpatient Medications   Medication Instructions    Dextromethorphan HBr (DEXALONE PO) Take by mouth    famotidine (PEPCID) 40 mg, 2 TIMES DAILY    sucralfate (CARAFATE) 1 g, 2 TIMES DAILY    topiramate (TOPAMAX SPRINKLE) 25 mg, 3 TIMES DAILY    Ubrelvy 100 mg, Oral, DAILY PRN       No Known Allergies    Review of Systems:   Constitutional: negative for weight changes or fevers  Genitourinary: negative for bowel/bladder incontinence   Musculoskeletal: positive for low back pain, leg pain  Neurological: positive left leg weakness and bilateral numbness/tingling  Behavioral/Psych: negative for anxiety/depression   All other systems reviewed and are

## 2025-04-17 ENCOUNTER — HOSPITAL ENCOUNTER (OUTPATIENT)
Dept: PHYSICAL THERAPY | Age: 40
Setting detail: THERAPIES SERIES
Discharge: HOME | End: 2025-04-17
Payer: COMMERCIAL

## 2025-04-17 PROCEDURE — 97110 THERAPEUTIC EXERCISES: CPT

## 2025-04-17 NOTE — PROGRESS NOTES
[facility]  PHYSICAL THERAPY  [] EVALUATION  [x] DAILY NOTE (LAND) [] DAILY NOTE (AQUATIC ) [] PROGRESS NOTE [] DISCHARGE NOTE    [] OUTPATIENT REHABILITATION CENTER [facility]   [] [place] AMBULATORY CARE [place]    [] [facility]   [x] [facility]    Date: 2025  Patient Name:  Michael Arana  : 1985  MRN: [id number]  CSN: [id number]    Referring Practitioner Ariella Olson PA-C [id number]      Diagnosis  Diagnoses       G89.29 (ICD-10-CM) - Other chronic pain           Treatment Diagnosis M54.59  Other Low Back Pain  M62.81 Generalized Muscle Weakness   Date of Evaluation 3/13/25   Additional Pertinent History Michael Arana has a past medical history of GERD (gastroesophageal reflux disease), Pancreas divisum, and Stomach ulcer.  he has a past surgical history that includes Colonoscopy; Endoscopy, colon, diagnostic; and Pain management procedure (N/A, 2024).     Allergies No Known Allergies   Medications   Current Outpatient Medications:     topiramate (TOPAMAX SPRINKLE) 25 MG capsule, Take 1 capsule by mouth 3 times daily, Disp: , Rfl:     Ubrogepant (UBRELVY) 100 MG TABS, Take 100 mg by mouth daily as needed (pain), Disp: 16 tablet, Rfl: 0    Dextromethorphan HBr (DEXALONE PO), Take by mouth, Disp: , Rfl:     famotidine (PEPCID) 40 MG tablet, Take 1 tablet by mouth 2 times daily, Disp: , Rfl:     sucralfate (CARAFATE) 1 GM tablet, Take 1 tablet by mouth 2 times daily, Disp: , Rfl:       Functional Outcome Measure Used Modified MARIA INES   Functional Outcome Score 25/50 or 50% disability (3/13/25)   21/50 or 42% disability (4/10/25)      Insurance: Primary: Payor: [facility] /  /  / ,   Secondary:    Authorization Information PRE CERTIFICATION REQUIRED: Additional authorization not required.  INSURANCE THERAPY BENEFIT: Allowed 70 visits of OT, PT, COMBINED per calendar year.  0 visits have been used.. ST IS 30 VISITS PER CALENDAR YEAR Hard Max..   AQUATIC THERAPY

## 2025-04-22 ENCOUNTER — HOSPITAL ENCOUNTER (OUTPATIENT)
Dept: PHYSICAL THERAPY | Age: 40
Setting detail: THERAPIES SERIES
Discharge: HOME | End: 2025-04-22
Payer: COMMERCIAL

## 2025-04-22 PROCEDURE — 97035 APP MDLTY 1+ULTRASOUND EA 15: CPT

## 2025-04-22 PROCEDURE — 97110 THERAPEUTIC EXERCISES: CPT

## 2025-04-22 PROCEDURE — 97140 MANUAL THERAPY 1/> REGIONS: CPT

## 2025-04-22 NOTE — PROGRESS NOTES
[facility]  PHYSICAL THERAPY  [] EVALUATION  [x] DAILY NOTE (LAND) [] DAILY NOTE (AQUATIC ) [] PROGRESS NOTE [] DISCHARGE NOTE    [] OUTPATIENT REHABILITATION CENTER [facility]   [] [place] AMBULATORY CARE [place]    [] [facility]   [x] [facility]    Date: 2025  Patient Name:  Michael Arana  : 1985  MRN: [id number]  CSN: [id number]    Referring Practitioner Ariella Olson PA-C [id number]      Diagnosis  Diagnoses       G89.29 (ICD-10-CM) - Other chronic pain           Treatment Diagnosis M54.59  Other Low Back Pain  M62.81 Generalized Muscle Weakness   Date of Evaluation 3/13/25   Additional Pertinent History Michael Arana has a past medical history of GERD (gastroesophageal reflux disease), Pancreas divisum, and Stomach ulcer.  he has a past surgical history that includes Colonoscopy; Endoscopy, colon, diagnostic; and Pain management procedure (N/A, 2024).     Allergies No Known Allergies   Medications   Current Outpatient Medications:     topiramate (TOPAMAX SPRINKLE) 25 MG capsule, Take 1 capsule by mouth 3 times daily, Disp: , Rfl:     Ubrogepant (UBRELVY) 100 MG TABS, Take 100 mg by mouth daily as needed (pain), Disp: 16 tablet, Rfl: 0    Dextromethorphan HBr (DEXALONE PO), Take by mouth, Disp: , Rfl:     famotidine (PEPCID) 40 MG tablet, Take 1 tablet by mouth 2 times daily, Disp: , Rfl:     sucralfate (CARAFATE) 1 GM tablet, Take 1 tablet by mouth 2 times daily, Disp: , Rfl:       Functional Outcome Measure Used Modified MARIA INES   Functional Outcome Score 25/50 or 50% disability (3/13/25)   21/50 or 42% disability (4/10/25)      Insurance: Primary: Payor: [facility] /  /  / ,   Secondary:    Authorization Information PRE CERTIFICATION REQUIRED: Additional authorization not required.  INSURANCE THERAPY BENEFIT: Allowed 70 visits of OT, PT, COMBINED per calendar year.  0 visits have been used.. ST IS 30 VISITS PER CALENDAR YEAR Hard Max..   AQUATIC THERAPY

## 2025-04-24 ENCOUNTER — HOSPITAL ENCOUNTER (OUTPATIENT)
Dept: PHYSICAL THERAPY | Age: 40
Setting detail: THERAPIES SERIES
Discharge: HOME | End: 2025-04-24
Payer: COMMERCIAL

## 2025-04-24 PROCEDURE — 97035 APP MDLTY 1+ULTRASOUND EA 15: CPT

## 2025-04-24 PROCEDURE — 97110 THERAPEUTIC EXERCISES: CPT

## 2025-04-24 NOTE — PROGRESS NOTES
[facility]  PHYSICAL THERAPY  [] EVALUATION  [x] DAILY NOTE (LAND) [] DAILY NOTE (AQUATIC ) [] PROGRESS NOTE [] DISCHARGE NOTE    [] OUTPATIENT REHABILITATION CENTER [facility]   [] [place] AMBULATORY CARE [place]    [] [facility]   [x] [facility]    Date: 2025  Patient Name:  Michael Arana  : 1985  MRN: [id number]  CSN: [id number]    Referring Practitioner Ariella Olson PA-C [id number]      Diagnosis  Diagnoses       G89.29 (ICD-10-CM) - Other chronic pain           Treatment Diagnosis M54.59  Other Low Back Pain  M62.81 Generalized Muscle Weakness   Date of Evaluation 3/13/25   Additional Pertinent History Michael Arana has a past medical history of GERD (gastroesophageal reflux disease), Pancreas divisum, and Stomach ulcer.  he has a past surgical history that includes Colonoscopy; Endoscopy, colon, diagnostic; and Pain management procedure (N/A, 2024).     Allergies No Known Allergies   Medications   Current Outpatient Medications:     topiramate (TOPAMAX SPRINKLE) 25 MG capsule, Take 1 capsule by mouth 3 times daily, Disp: , Rfl:     Ubrogepant (UBRELVY) 100 MG TABS, Take 100 mg by mouth daily as needed (pain), Disp: 16 tablet, Rfl: 0    Dextromethorphan HBr (DEXALONE PO), Take by mouth, Disp: , Rfl:     famotidine (PEPCID) 40 MG tablet, Take 1 tablet by mouth 2 times daily, Disp: , Rfl:     sucralfate (CARAFATE) 1 GM tablet, Take 1 tablet by mouth 2 times daily, Disp: , Rfl:       Functional Outcome Measure Used Modified MARIA INES   Functional Outcome Score 25/50 or 50% disability (3/13/25)   21/50 or 42% disability (4/10/25)      Insurance: Primary: Payor: [facility] /  /  / ,   Secondary:    Authorization Information PRE CERTIFICATION REQUIRED: Additional authorization not required.  INSURANCE THERAPY BENEFIT: Allowed 70 visits of OT, PT, COMBINED per calendar year.  0 visits have been used.. ST IS 30 VISITS PER CALENDAR YEAR Hard Max..   AQUATIC THERAPY

## 2025-04-29 ENCOUNTER — HOSPITAL ENCOUNTER (OUTPATIENT)
Dept: PHYSICAL THERAPY | Age: 40
Setting detail: THERAPIES SERIES
Discharge: HOME | End: 2025-04-29
Payer: COMMERCIAL

## 2025-04-29 PROCEDURE — 97110 THERAPEUTIC EXERCISES: CPT

## 2025-04-29 PROCEDURE — 97035 APP MDLTY 1+ULTRASOUND EA 15: CPT

## 2025-04-29 NOTE — PROGRESS NOTES
[facility]  PHYSICAL THERAPY  [] EVALUATION  [] DAILY NOTE (LAND) [] DAILY NOTE (AQUATIC ) [x] PROGRESS NOTE [] DISCHARGE NOTE    [] OUTPATIENT REHABILITATION CENTER [facility]   [] [place] [facility]    [] [facility]   [x] [facility]    Date: 2025  Patient Name:  Michael Arana  : 1985  MRN: [id number]  CSN: [id number]    Referring Practitioner Ariella Olson PA-C [id number]      Diagnosis  Diagnoses       G89.29 (ICD-10-CM) - Other chronic pain           Treatment Diagnosis M54.59  Other Low Back Pain  M62.81 Generalized Muscle Weakness   Date of Evaluation 3/13/25   Additional Pertinent History Michael Arana has a past medical history of GERD (gastroesophageal reflux disease), Pancreas divisum, and Stomach ulcer.  he has a past surgical history that includes Colonoscopy; Endoscopy, colon, diagnostic; and Pain management procedure (N/A, 2024).     Allergies No Known Allergies   Medications   Current Outpatient Medications:     topiramate (TOPAMAX SPRINKLE) 25 MG capsule, Take 1 capsule by mouth 3 times daily, Disp: , Rfl:     Ubrogepant (UBRELVY) 100 MG TABS, Take 100 mg by mouth daily as needed (pain), Disp: 16 tablet, Rfl: 0    Dextromethorphan HBr (DEXALONE PO), Take by mouth, Disp: , Rfl:     famotidine (PEPCID) 40 MG tablet, Take 1 tablet by mouth 2 times daily, Disp: , Rfl:     sucralfate (CARAFATE) 1 GM tablet, Take 1 tablet by mouth 2 times daily, Disp: , Rfl:       Functional Outcome Measure Used Modified MARIA INES   Functional Outcome Score 25/50 or 50% disability (3/13/25)   21/50 or 42% disability (4/10/25)  20/50 or 40% disability (25)      Insurance: Primary: Payor: [facility] /  /  / ,   Secondary:    Authorization Information PRE CERTIFICATION REQUIRED: Additional authorization not required.  INSURANCE THERAPY BENEFIT: Allowed 70 visits of OT, PT, COMBINED per calendar year.  0 visits have been used.. ST IS 30 VISITS PER

## 2025-05-19 ENCOUNTER — OFFICE VISIT (OUTPATIENT)
Dept: PHYSICAL MEDICINE AND REHAB | Age: 40
End: 2025-05-19
Payer: COMMERCIAL

## 2025-05-19 ENCOUNTER — TELEPHONE (OUTPATIENT)
Dept: PHYSICAL MEDICINE AND REHAB | Age: 40
End: 2025-05-19

## 2025-05-19 VITALS
WEIGHT: 202 LBS | BODY MASS INDEX: 29.92 KG/M2 | HEIGHT: 69 IN | DIASTOLIC BLOOD PRESSURE: 84 MMHG | SYSTOLIC BLOOD PRESSURE: 128 MMHG

## 2025-05-19 DIAGNOSIS — G89.4 CHRONIC PAIN SYNDROME: Primary | ICD-10-CM

## 2025-05-19 DIAGNOSIS — M79.7 FIBROMYALGIA: ICD-10-CM

## 2025-05-19 DIAGNOSIS — M79.2 NEUROPATHIC PAIN: ICD-10-CM

## 2025-05-19 DIAGNOSIS — M54.17 RADICULOPATHY, LUMBOSACRAL REGION: ICD-10-CM

## 2025-05-19 DIAGNOSIS — M54.12 CERVICAL RADICULOPATHY: ICD-10-CM

## 2025-05-19 DIAGNOSIS — M79.18 MYOFASCIAL PAIN: ICD-10-CM

## 2025-05-19 PROCEDURE — 99214 OFFICE O/P EST MOD 30 MIN: CPT | Performed by: NURSE PRACTITIONER

## 2025-05-19 RX ORDER — METHOCARBAMOL 750 MG/1
750 TABLET, FILM COATED ORAL 3 TIMES DAILY PRN
COMMUNITY
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
## 2025-05-19 NOTE — TELEPHONE ENCOUNTER
Can you reach out to Dr. Willie Correa MD, neurology at [facility] to see if I can take over prescribing Topamax.  He is currently on it for headaches but I am treating neuropathic pain secondary to cervical and lumbar stenosis.  I would like to increase this medication to 50 mg in the morning and 100 mg at night.

## 2025-05-19 NOTE — PROGRESS NOTES
Functionality Assessment/Goals Worksheet     On a scale of 0 (Does not Interfere) to 10 (Completely Interferes)     1.  Which number describes how during the past week pain has interfered with           the following:  A.  General Activity:  5  B.  Mood: 5  C.  Walking Ability:  6  D.  Normal Work (Includes both work outside the home and housework):  6  E.  Relations with Other People:   5  F.  Sleep:   3  G.  Enjoyment of Life:   4    2.  Patient Prefers to Take their Pain Medications:     []  On a regular basis   [x]  Only when necessary    []  Does not take pain medications    3.  What are the Patient's Goals/Expectations for Visiting Pain Management?     []  Learn about my pain    [x]  Receive Medication   []  Physical Therapy     []  Treat Depression   [x]  Receive Injections    []  Treat Sleep   []  Deal with Anxiety and Stress   []  Treat Opoid Dependence/Addiction   []  Other:

## 2025-05-20 NOTE — TELEPHONE ENCOUNTER
Called DR. Correa office and he is out of office/not available till next tues 27th. Office staff sent him a message and will call back next week regarding your message.

## 2025-05-20 NOTE — TELEPHONE ENCOUNTER
Called pt and LVM of stil waiting on response regarding topamax from neurologist and will not likely hear from them till next tues or wed.

## 2025-06-18 ENCOUNTER — OFFICE VISIT (OUTPATIENT)
Dept: PHYSICAL MEDICINE AND REHAB | Age: 40
End: 2025-06-18
Payer: COMMERCIAL

## 2025-06-18 VITALS
SYSTOLIC BLOOD PRESSURE: 118 MMHG | BODY MASS INDEX: 29.92 KG/M2 | DIASTOLIC BLOOD PRESSURE: 80 MMHG | HEIGHT: 69 IN | WEIGHT: 202 LBS

## 2025-06-18 DIAGNOSIS — M54.12 CERVICAL RADICULOPATHY: ICD-10-CM

## 2025-06-18 DIAGNOSIS — M54.17 RADICULOPATHY, LUMBOSACRAL REGION: ICD-10-CM

## 2025-06-18 DIAGNOSIS — M79.18 MYOFASCIAL PAIN: ICD-10-CM

## 2025-06-18 DIAGNOSIS — M79.2 NEUROPATHIC PAIN: ICD-10-CM

## 2025-06-18 DIAGNOSIS — M51.362 DEGENERATION OF INTERVERTEBRAL DISC OF LUMBAR REGION WITH DISCOGENIC BACK PAIN AND LOWER EXTREMITY PAIN: ICD-10-CM

## 2025-06-18 DIAGNOSIS — G89.4 CHRONIC PAIN SYNDROME: Primary | ICD-10-CM

## 2025-06-18 DIAGNOSIS — M79.7 FIBROMYALGIA: ICD-10-CM

## 2025-06-18 PROCEDURE — 99214 OFFICE O/P EST MOD 30 MIN: CPT | Performed by: NURSE PRACTITIONER

## 2025-06-18 RX ORDER — HYDROCODONE BITARTRATE AND ACETAMINOPHEN 5; 325 MG/1; MG/1
1 TABLET ORAL 2 TIMES DAILY PRN
Qty: 14 TABLET | Refills: 0 | Status: SHIPPED | OUTPATIENT
Start: 2025-06-18 | End: 2025-06-25

## 2025-06-18 RX ORDER — TOPIRAMATE 50 MG/1
TABLET, FILM COATED ORAL
Qty: 60 TABLET | Refills: 0 | Status: SHIPPED | OUTPATIENT
Start: 2025-06-18

## 2025-06-18 NOTE — PROGRESS NOTES
Functionality Assessment/Goals Worksheet     On a scale of 0 (Does not Interfere) to 10 (Completely Interferes)     1.  Which number describes how during the past week pain has interfered with           the following:  A.  General Activity:  5  B.  Mood: 5  C.  Walking Ability:  5  D.  Normal Work (Includes both work outside the home and housework):  4  E.  Relations with Other People:   4  F.  Sleep:   4  G.  Enjoyment of Life:   4    2.  Patient Prefers to Take their Pain Medications:     []  On a regular basis   [x]  Only when necessary    []  Does not take pain medications    3.  What are the Patient's Goals/Expectations for Visiting Pain Management?     []  Learn about my pain    [x]  Receive Medication   []  Physical Therapy     []  Treat Depression   []  Receive Injections    []  Treat Sleep   []  Deal with Anxiety and Stress   []  Treat Opoid Dependence/Addiction   []  Other:
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
## 2025-07-17 ENCOUNTER — TELEPHONE (OUTPATIENT)
Dept: PHYSICAL MEDICINE AND REHAB | Age: 40
End: 2025-07-17

## 2025-07-17 NOTE — TELEPHONE ENCOUNTER
Upon rescheduling the pt's appointment, he stated that if an appointment is not necessary, he'd rather have a refill in his topamax. He'd like to have it increased from 150mg to 200mg. FYI, he stated that when filling his last refill, he had only received 20 tablets.

## 2025-07-18 RX ORDER — TOPIRAMATE 100 MG/1
100 TABLET, FILM COATED ORAL 2 TIMES DAILY
Qty: 60 TABLET | Refills: 3 | Status: SHIPPED | OUTPATIENT
Start: 2025-07-18

## 2025-07-18 NOTE — TELEPHONE ENCOUNTER
Notif. Pt. Ok for increase on Topamax as per DEAN Muller to 100mg bid. He verbalized understanding.

## 2025-07-29 ENCOUNTER — OFFICE VISIT (OUTPATIENT)
Dept: PHYSICAL MEDICINE AND REHAB | Age: 40
End: 2025-07-29
Payer: COMMERCIAL

## 2025-07-29 VITALS
HEIGHT: 69 IN | WEIGHT: 201.94 LBS | DIASTOLIC BLOOD PRESSURE: 78 MMHG | BODY MASS INDEX: 29.91 KG/M2 | SYSTOLIC BLOOD PRESSURE: 128 MMHG

## 2025-07-29 DIAGNOSIS — M79.2 NEUROPATHIC PAIN: ICD-10-CM

## 2025-07-29 DIAGNOSIS — G89.4 CHRONIC PAIN SYNDROME: Primary | ICD-10-CM

## 2025-07-29 DIAGNOSIS — M54.17 RADICULOPATHY, LUMBOSACRAL REGION: ICD-10-CM

## 2025-07-29 DIAGNOSIS — M54.12 CERVICAL RADICULOPATHY: ICD-10-CM

## 2025-07-29 DIAGNOSIS — M79.7 FIBROMYALGIA: ICD-10-CM

## 2025-07-29 DIAGNOSIS — M79.18 MYOFASCIAL PAIN: ICD-10-CM

## 2025-07-29 PROCEDURE — 99214 OFFICE O/P EST MOD 30 MIN: CPT | Performed by: NURSE PRACTITIONER
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
## 2025-07-29 NOTE — PROGRESS NOTES
[facility] PROFESSIONAL SERVS  [facility] NEUROSCIENCE AND REHABILITATION [address]  [facility] [address]  Dept: [phone]  Dept Fax: [phone]  Loc: [phone]    Visit Date: 7/29/2025    Functionality Assessment/Goals Worksheet     On a scale of 0 (Does not Interfere) to 10 (Completely Interferes)     1.  Which number describes how during the past week pain has interfered with       the following:  A.  General Activity:  6  B.  Mood: 6  C.  Walking Ability:  6  D.  Normal Work (Includes both work outside the home and housework):  6  E.  Relations with Other People:   6  F.  Sleep:   6  G.  Enjoyment of Life:   6    2.  Patient Prefers to Take their Pain Medications:     []  On a regular basis   [x]  Only when necessary    []  Does not take pain medications    3.  What are the Patient's Goals/Expectations for Visiting Pain Management?     []  Learn about my pain    [x]  Receive Medication   []  Physical Therapy     []  Treat Depression   []  Receive Injections    []  Treat Sleep   []  Deal with Anxiety and Stress   []  Treat Opoid Dependence/Addiction   []  Other:

## (undated) DEVICE — SYRINGE MED 7ML PLAS LUERSLIP LOSS OF RESISTANCE EPILOR

## (undated) DEVICE — GLOVE ORANGE PI 8 1/2   MSG9085

## (undated) DEVICE — SYRINGE MEDICAL 3ML CLEAR PLASTIC STANDARD NON CONTROL LUERLOCK TIP DISPOSABLE

## (undated) DEVICE — APPLICATOR MEDICATED 26 CC SOLUTION HI LT ORNG CHLORAPREP

## (undated) DEVICE — SC PAIN PACK: Brand: MEDLINE INDUSTRIES, INC.

## (undated) DEVICE — NEEDLE EPI L3.5IN OD20GA CLR POLYCARB HUB WNG N DEHP TUOHY